# Patient Record
(demographics unavailable — no encounter records)

---

## 2025-01-06 NOTE — PHYSICAL EXAM
[TextEntry] : General: The patient was alert, oriented and in no distress. Voice was clear.  Face: The patient had no facial asymmetry or mass. The skin was unremarkable.  Ears: Hearing normal to conversational voice External ears were normal without deformity. PROCEDURE- EAR MICROSCOPY  Indication: cerumen removal Under the microscope, scant cerumen was removed atraumatically with a curette. Canals: normal. no inflammation. Tympanic membranes: Intact. no perforation or effusion.  Nose: The external nose had no significant deformity. On anterior rhinoscopy, the nasal mucosa was clear The anterior septum was grossly midline. There were no visualized polyps, purulence or masses.  Oral cavity: Oral mucosa- normal. Oral and base of tongue- clear and without mass. Gingival and buccal mucosa- moist and without lesions. Palate- the palate moved well. There was no cleft palate. There appeared to be good salivary flow. Oral cavity/oropharynx- no pus, erythema or mass  Neck: The neck was symmetrical. The parotid and submandibular glands were normal without masses. The trachea was midline and there was no unusual crepitus. Thyroid was smooth and nontender and no masses were palpated. No masses  Lymphatics: Cervical adenopathy- none.

## 2025-01-06 NOTE — ASSESSMENT
[FreeTextEntry1] : He had scant cerumen which was removed.  He has allergies and is seeing an allergist.  He is going to undergo immunotherapy  Plan -Findings and management options were discussed with the patient. - Continue allergy and sinus medications as needed - He will follow-up with the allergist - Continue good ear hygiene - Follow-up in approximately 4 months or earlier if needed

## 2025-01-06 NOTE — HISTORY OF PRESENT ILLNESS
[de-identified] : ANY WHITE is a 48 year old patient Here for recurrent cerumen impaction.  He is ears have not been bothering him much.  He is still seeing the allergist and may begin immunotherapy.  He saw pulmonologist and was diagnosed with severe obstructive sleep apnea.  He started using CPAP.  ENT history No history of recurrent middle ear infections, prior otologic surgery, ear trauma, or excessive noise exposure  He has a history of allergies and had immunotherapy in [facility]. He is seeing an allergist in [facility].  He uses a nasal steroid spray, Astelin, ipratropium nasal spray and antihistamine as needed He gets 1-2 sinus infections per year

## 2025-04-21 NOTE — ASSESSMENT
[FreeTextEntry1] : He had scant cerumen which was removed.  Plan -Findings and management options were discussed with the patient. - Continue allergy and sinus medications as needed - continue immunotherapy  - Continue good ear hygiene - Follow-up in approximately 4 months or earlier if needed

## 2025-04-21 NOTE — HISTORY OF PRESENT ILLNESS
[de-identified] : ANY WHITE is a 48 year old patient with a history of allergies and recurrent cerumen impaction.  He is here to check his ear for wax buildup.  He has no otalgia or otorrhea.  He did start immunotherapy for allergies.  ENT history No history of recurrent middle ear infections, prior otologic surgery, ear trauma, or excessive noise exposure  He has a history of allergies and had immunotherapy in [facility]. He is seeing an allergist in [facility].  He uses a nasal steroid spray, Astelin, ipratropium nasal spray and antihistamine as needed He gets 1-2 sinus infections per year  He saw pulmonologist and was diagnosed with severe obstructive sleep apnea. He started using CPAP.

## 2025-04-21 NOTE — PHYSICAL EXAM
[TextEntry] : General: The patient was alert, oriented and in no distress. Voice was clear.  Face: The patient had no facial asymmetry or mass. The skin was unremarkable.  Ears: Hearing normal to conversational voice External ears were normal without deformity. PROCEDURE- EAR MICROSCOPY  Indication: cerumen removal Under the microscope, scant cerumen was removed atraumatically with a curette. Canals: normal. no inflammation. Tympanic membranes: Intact. no perforation or effusion.  Nose: The external nose had no significant deformity. On anterior rhinoscopy, the nasal mucosa was clear The anterior septum was grossly midline. There were no visualized polyps, purulence or masses.  Oral cavity: Oral mucosa- normal. Oral and base of tongue- clear and without mass. Gingival and buccal mucosa- moist and without lesions. Palate- the palate moved well. There was no cleft palate. There appeared to be good salivary flow. Oral cavity/oropharynx- no pus, erythema or mass  Neck: The neck was symmetrical. The parotid and submandibular glands were normal without masses. The trachea was midline and there was no unusual crepitus. Thyroid was smooth and nontender and no masses were palpated. No masses  Lymphatics: Cervical adenopathy- none. supervision/seated